# Patient Record
Sex: MALE | Race: WHITE | Employment: OTHER | URBAN - NONMETROPOLITAN AREA
[De-identification: names, ages, dates, MRNs, and addresses within clinical notes are randomized per-mention and may not be internally consistent; named-entity substitution may affect disease eponyms.]

---

## 2024-06-04 PROBLEM — K85.90 PANCREATITIS, UNSPECIFIED PANCREATITIS TYPE: Status: ACTIVE | Noted: 2024-06-04

## 2024-06-05 PROBLEM — I10 PRIMARY HYPERTENSION: Status: ACTIVE | Noted: 2024-06-05

## 2024-06-05 PROBLEM — E78.5 HYPERLIPIDEMIA: Status: ACTIVE | Noted: 2024-06-05

## 2024-06-05 PROBLEM — E11.65 POORLY CONTROLLED TYPE 2 DIABETES MELLITUS (HCC): Status: ACTIVE | Noted: 2024-06-05

## 2024-06-05 PROBLEM — K76.0 NAFLD (NONALCOHOLIC FATTY LIVER DISEASE): Status: ACTIVE | Noted: 2024-06-05

## 2024-06-05 PROBLEM — G47.00 INSOMNIA: Status: ACTIVE | Noted: 2024-06-05

## 2024-06-05 PROBLEM — F41.8 DEPRESSION WITH ANXIETY: Status: ACTIVE | Noted: 2024-06-05

## 2024-06-10 ENCOUNTER — TELEPHONE (OUTPATIENT)
Dept: PULMONOLOGY | Age: 64
End: 2024-06-10

## 2024-06-10 NOTE — TELEPHONE ENCOUNTER
----- Message from MARTIN Grover CNP sent at 6/8/2024 10:23 AM EDT -----  Please schedule patient for PFT and f/u in pulm with me in 2-3 months. Thanks!    Appointment made

## 2024-07-11 NOTE — PROGRESS NOTES
Dr. Umanzor, Anesthesia, reviewed case and low platelets of 65 on 6/8/24, 75 on 6/6/24. Dr. Umanzor would like patient to have a Hematology consult before cervical spine fusion surgery.  I notified Valeria MILIAN in Dr. Dee's office.

## 2024-07-11 NOTE — PROGRESS NOTES
Ani from [facility] called back from DR Dee office.  State had previous 2009 cervical and 2009 Lumbar .  She will call pt to have him call us for PAT call

## 2024-07-11 NOTE — PROGRESS NOTES
Called devika/britney and left message to have them call us back.  Also left message informing of patient Platelet level of 65

## 2024-07-15 RX ORDER — FUROSEMIDE 20 MG/1
40 TABLET ORAL DAILY
COMMUNITY

## 2024-07-15 RX ORDER — FLUTICASONE FUROATE, UMECLIDINIUM BROMIDE AND VILANTEROL TRIFENATATE 100; 62.5; 25 UG/1; UG/1; UG/1
1 POWDER RESPIRATORY (INHALATION) DAILY
COMMUNITY

## 2024-07-15 RX ORDER — SPIRONOLACTONE 25 MG/1
50 TABLET ORAL DAILY
COMMUNITY
Start: 2018-07-03

## 2024-07-15 RX ORDER — CHLORAL HYDRATE 500 MG
1 CAPSULE ORAL DAILY
COMMUNITY

## 2024-07-15 NOTE — PROGRESS NOTES
Left message for [facility]A [facility] Medical Records - needing Hematology clearance pre-op yet, per anesthesia request (low platelets at 65).  Await response.

## 2024-07-15 NOTE — PROGRESS NOTES
Hold for 7 days   Fish OIl,   Hold day of surgery   Lisinopril and lasix   Hold insulin day of surgery  Decrease Lantus to 30 units night before surgery     Take am of surgery  Trelegy

## 2024-07-15 NOTE — FLOWSHEET NOTE
Cervical Surgery Pre-op Instructions  Nothing to eat or drink after midnight the night before surgery except sips of water to take medications, this includes no mints, chewing gum or ice chips  No smoking or chewing tobacco 24 hours before surgery  Bring photo ID and any health insurance cards  Wear comfortable clean loose fitting clothing  Do not wear any jewelry, make up, body piercings or contact lenses   Bring your medication in the original bottles  Wear clean clothes to bed and place fresh clean sheets and pillowcases on your bed the night before surgery  Showering:  Shower 2 days before, day before and morning of surgery using the StartClean® kit provided (follow the instructions provided with the kit).  Do not shave the surgical area! If needed, your nurse will use clippers to remove any excess hair at the surgical site when you come in for surgery. Do not use a razor on the site of your surgery for at least 2 days prior to surgery because shaving can leave tiny nicks in the skin which may allow germs to enter and cause infection.  Please have 2 Home Health Agencies in mind for post op; 1st and 2nd choice.  Remember: POST OP ... NO BLTs ; No Bending, NO Lifting, NO Twisting until ok'd by .    If you have any questions about your preoperative preparations, please call the [facility] Pre-Admission Testing department at [phone].         START CLEAN® KIT SHOWER INSTRUCITONS SURGERY:      __7-15-24______ (date)  FIRST SHOWER: Two days before surgery: Take a shower and wash your entire body, including your hair and scalp in the following manner:  Wash your hair using normal shampoo. Make sure you rinse the shampoo from your hair and body. Wash your face with your regular soap or cleanser.  Use one of the sponges in the Start Clean® kit and apply 1/3 of the Chlorhexidine soap to the sponge, wash from your neck down.  This is very important. Do not use the soap on your face or hair and avoid private

## 2024-07-16 NOTE — PROGRESS NOTES
Medical clearance obtained.  Per PCP, patient's platelets low d/t chronic hx of cirrhosis likely.    Per surgeon's office, will repeat CBC day of surgery to check platelet count.

## 2024-07-17 ENCOUNTER — APPOINTMENT (OUTPATIENT)
Dept: GENERAL RADIOLOGY | Age: 64
End: 2024-07-17
Attending: ORTHOPAEDIC SURGERY
Payer: COMMERCIAL

## 2024-07-17 ENCOUNTER — ANESTHESIA (OUTPATIENT)
Dept: OPERATING ROOM | Age: 64
End: 2024-07-17
Payer: COMMERCIAL

## 2024-07-17 ENCOUNTER — ANESTHESIA EVENT (OUTPATIENT)
Dept: OPERATING ROOM | Age: 64
End: 2024-07-17
Payer: COMMERCIAL

## 2024-07-17 ENCOUNTER — HOSPITAL ENCOUNTER (OUTPATIENT)
Age: 64
Discharge: HOME | End: 2024-07-18
Attending: ORTHOPAEDIC SURGERY | Admitting: ORTHOPAEDIC SURGERY
Payer: COMMERCIAL

## 2024-07-17 DIAGNOSIS — Z98.1 S/P CERVICAL SPINAL FUSION: Primary | ICD-10-CM

## 2024-07-17 PROBLEM — Z98.890 POST-OPERATIVE STATE: Status: ACTIVE | Noted: 2024-07-17

## 2024-07-17 LAB
ABO: NORMAL
ANTIBODY SCREEN: NORMAL
DEPRECATED RDW RBC AUTO: 43.3 FL (ref 35–45)
ERYTHROCYTE [DISTWIDTH] IN BLOOD BY AUTOMATED COUNT: 14.4 % (ref 11.5–14.5)
GLUCOSE BLD STRIP.AUTO-MCNC: 156 MG/DL (ref 70–108)
GLUCOSE BLD STRIP.AUTO-MCNC: 293 MG/DL (ref 70–108)
HCT VFR BLD AUTO: 49.5 % (ref 42–52)
HGB BLD-MCNC: 16.6 GM/DL (ref 14–18)
MCH RBC QN AUTO: 28 PG (ref 26–33)
MCHC RBC AUTO-ENTMCNC: 33.5 GM/DL (ref 32.2–35.5)
MCV RBC AUTO: 83.5 FL (ref 80–94)
PLATELET # BLD AUTO: 80 THOU/MM3 (ref 130–400)
PMV BLD AUTO: 11.1 FL (ref 9.4–12.4)
RBC # BLD AUTO: 5.93 MILL/MM3 (ref 4.7–6.1)
RH FACTOR: NORMAL
SCAN OF BLOOD SMEAR: NORMAL
WBC # BLD AUTO: 3.9 THOU/MM3 (ref 4.8–10.8)

## 2024-07-17 PROCEDURE — 72020 X-RAY EXAM OF SPINE 1 VIEW: CPT

## 2024-07-17 PROCEDURE — 6360000002 HC RX W HCPCS: Performed by: NURSE ANESTHETIST, CERTIFIED REGISTERED

## 2024-07-17 PROCEDURE — 2720000010 HC SURG SUPPLY STERILE: Performed by: ORTHOPAEDIC SURGERY

## 2024-07-17 PROCEDURE — 6370000000 HC RX 637 (ALT 250 FOR IP): Performed by: PHYSICIAN ASSISTANT

## 2024-07-17 PROCEDURE — 2709999900 HC NON-CHARGEABLE SUPPLY: Performed by: ORTHOPAEDIC SURGERY

## 2024-07-17 PROCEDURE — 3600000014 HC SURGERY LEVEL 4 ADDTL 15MIN: Performed by: ORTHOPAEDIC SURGERY

## 2024-07-17 PROCEDURE — 2500000003 HC RX 250 WO HCPCS: Performed by: ORTHOPAEDIC SURGERY

## 2024-07-17 PROCEDURE — 82948 REAGENT STRIP/BLOOD GLUCOSE: CPT

## 2024-07-17 PROCEDURE — 2580000003 HC RX 258: Performed by: PHYSICIAN ASSISTANT

## 2024-07-17 PROCEDURE — 86850 RBC ANTIBODY SCREEN: CPT

## 2024-07-17 PROCEDURE — L0120 CERV FLEX N/ADJ FOAM PRE OTS: HCPCS | Performed by: ORTHOPAEDIC SURGERY

## 2024-07-17 PROCEDURE — 6360000002 HC RX W HCPCS: Performed by: PHYSICIAN ASSISTANT

## 2024-07-17 PROCEDURE — 7100000000 HC PACU RECOVERY - FIRST 15 MIN: Performed by: ORTHOPAEDIC SURGERY

## 2024-07-17 PROCEDURE — 7100000011 HC PHASE II RECOVERY - ADDTL 15 MIN: Performed by: ORTHOPAEDIC SURGERY

## 2024-07-17 PROCEDURE — P9037 PLATE PHERES LEUKOREDU IRRAD: HCPCS

## 2024-07-17 PROCEDURE — 3700000000 HC ANESTHESIA ATTENDED CARE: Performed by: ORTHOPAEDIC SURGERY

## 2024-07-17 PROCEDURE — 3700000001 HC ADD 15 MINUTES (ANESTHESIA): Performed by: ORTHOPAEDIC SURGERY

## 2024-07-17 PROCEDURE — 36415 COLL VENOUS BLD VENIPUNCTURE: CPT

## 2024-07-17 PROCEDURE — C1713 ANCHOR/SCREW BN/BN,TIS/BN: HCPCS | Performed by: ORTHOPAEDIC SURGERY

## 2024-07-17 PROCEDURE — 2500000003 HC RX 250 WO HCPCS: Performed by: NURSE ANESTHETIST, CERTIFIED REGISTERED

## 2024-07-17 PROCEDURE — 6360000002 HC RX W HCPCS: Performed by: ANESTHESIOLOGY

## 2024-07-17 PROCEDURE — 7100000010 HC PHASE II RECOVERY - FIRST 15 MIN: Performed by: ORTHOPAEDIC SURGERY

## 2024-07-17 PROCEDURE — 86901 BLOOD TYPING SEROLOGIC RH(D): CPT

## 2024-07-17 PROCEDURE — 86900 BLOOD TYPING SEROLOGIC ABO: CPT

## 2024-07-17 PROCEDURE — 2580000003 HC RX 258: Performed by: NURSE ANESTHETIST, CERTIFIED REGISTERED

## 2024-07-17 PROCEDURE — 3600000004 HC SURGERY LEVEL 4 BASE: Performed by: ORTHOPAEDIC SURGERY

## 2024-07-17 PROCEDURE — 85027 COMPLETE CBC AUTOMATED: CPT

## 2024-07-17 PROCEDURE — 7100000001 HC PACU RECOVERY - ADDTL 15 MIN: Performed by: ORTHOPAEDIC SURGERY

## 2024-07-17 DEVICE — GRAFT BNE 2 CC DBM FIBREX: Type: IMPLANTABLE DEVICE | Site: NECK | Status: FUNCTIONAL

## 2024-07-17 DEVICE — 4.0MM VARIABLE ANGLE SCREW, SELF-TAPPING, 16MM
Type: IMPLANTABLE DEVICE | Site: NECK | Status: FUNCTIONAL
Brand: ADMIRAL

## 2024-07-17 DEVICE — GRAFT BNE MED: Type: IMPLANTABLE DEVICE | Site: NECK | Status: FUNCTIONAL

## 2024-07-17 DEVICE — ALLOGRAFT BNE SPACER SM 0 14X12X7 MM PARL CORTICAL ELEMAX: Type: IMPLANTABLE DEVICE | Site: NECK | Status: FUNCTIONAL

## 2024-07-17 DEVICE — CERVICAL PLATE, 1 LEVEL, 12MM
Type: IMPLANTABLE DEVICE | Site: NECK | Status: FUNCTIONAL
Brand: ADMIRAL

## 2024-07-17 RX ORDER — SODIUM CHLORIDE 0.9 % (FLUSH) 0.9 %
5-40 SYRINGE (ML) INJECTION EVERY 12 HOURS SCHEDULED
Status: DISCONTINUED | OUTPATIENT
Start: 2024-07-17 | End: 2024-07-18 | Stop reason: HOSPADM

## 2024-07-17 RX ORDER — SODIUM CHLORIDE 9 MG/ML
INJECTION, SOLUTION INTRAMUSCULAR; INTRAVENOUS; SUBCUTANEOUS PRN
Status: DISCONTINUED | OUTPATIENT
Start: 2024-07-17 | End: 2024-07-17 | Stop reason: SDUPTHER

## 2024-07-17 RX ORDER — TRAZODONE HYDROCHLORIDE 100 MG/1
100 TABLET ORAL NIGHTLY
Status: DISCONTINUED | OUTPATIENT
Start: 2024-07-17 | End: 2024-07-18 | Stop reason: HOSPADM

## 2024-07-17 RX ORDER — GLUCAGON 1 MG/ML
1 KIT INJECTION PRN
Status: DISCONTINUED | OUTPATIENT
Start: 2024-07-17 | End: 2024-07-18 | Stop reason: HOSPADM

## 2024-07-17 RX ORDER — BUDESONIDE AND FORMOTEROL FUMARATE DIHYDRATE 80; 4.5 UG/1; UG/1
2 AEROSOL RESPIRATORY (INHALATION)
Status: DISCONTINUED | OUTPATIENT
Start: 2024-07-17 | End: 2024-07-18 | Stop reason: HOSPADM

## 2024-07-17 RX ORDER — ROCURONIUM BROMIDE 10 MG/ML
INJECTION, SOLUTION INTRAVENOUS PRN
Status: DISCONTINUED | OUTPATIENT
Start: 2024-07-17 | End: 2024-07-17 | Stop reason: SDUPTHER

## 2024-07-17 RX ORDER — INSULIN GLARGINE 100 [IU]/ML
60 INJECTION, SOLUTION SUBCUTANEOUS NIGHTLY
Status: DISCONTINUED | OUTPATIENT
Start: 2024-07-17 | End: 2024-07-18 | Stop reason: HOSPADM

## 2024-07-17 RX ORDER — OXYCODONE HYDROCHLORIDE 5 MG/1
10 TABLET ORAL EVERY 4 HOURS PRN
Status: DISCONTINUED | OUTPATIENT
Start: 2024-07-17 | End: 2024-07-18 | Stop reason: HOSPADM

## 2024-07-17 RX ORDER — SUCCINYLCHOLINE/SOD CL,ISO/PF 200MG/10ML
SYRINGE (ML) INTRAVENOUS PRN
Status: DISCONTINUED | OUTPATIENT
Start: 2024-07-17 | End: 2024-07-17 | Stop reason: SDUPTHER

## 2024-07-17 RX ORDER — SODIUM CHLORIDE 0.9 % (FLUSH) 0.9 %
5-40 SYRINGE (ML) INJECTION EVERY 12 HOURS SCHEDULED
Status: DISCONTINUED | OUTPATIENT
Start: 2024-07-17 | End: 2024-07-17 | Stop reason: HOSPADM

## 2024-07-17 RX ORDER — SODIUM CHLORIDE 0.9 % (FLUSH) 0.9 %
5-40 SYRINGE (ML) INJECTION PRN
Status: DISCONTINUED | OUTPATIENT
Start: 2024-07-17 | End: 2024-07-17 | Stop reason: HOSPADM

## 2024-07-17 RX ORDER — DULOXETIN HYDROCHLORIDE 30 MG/1
90 CAPSULE, DELAYED RELEASE ORAL DAILY
Status: DISCONTINUED | OUTPATIENT
Start: 2024-07-18 | End: 2024-07-18 | Stop reason: HOSPADM

## 2024-07-17 RX ORDER — BISACODYL 5 MG/1
5 TABLET, DELAYED RELEASE ORAL DAILY PRN
Status: DISCONTINUED | OUTPATIENT
Start: 2024-07-17 | End: 2024-07-18 | Stop reason: HOSPADM

## 2024-07-17 RX ORDER — INSULIN LISPRO 100 [IU]/ML
0-8 INJECTION, SOLUTION INTRAVENOUS; SUBCUTANEOUS
Status: DISCONTINUED | OUTPATIENT
Start: 2024-07-18 | End: 2024-07-18 | Stop reason: HOSPADM

## 2024-07-17 RX ORDER — DEXTROSE MONOHYDRATE 100 MG/ML
INJECTION, SOLUTION INTRAVENOUS CONTINUOUS PRN
Status: DISCONTINUED | OUTPATIENT
Start: 2024-07-17 | End: 2024-07-18 | Stop reason: HOSPADM

## 2024-07-17 RX ORDER — CYCLOBENZAPRINE HCL 10 MG
10 TABLET ORAL 3 TIMES DAILY PRN
Status: DISCONTINUED | OUTPATIENT
Start: 2024-07-17 | End: 2024-07-18 | Stop reason: HOSPADM

## 2024-07-17 RX ORDER — PREGABALIN 50 MG/1
100 CAPSULE ORAL 2 TIMES DAILY
Status: DISCONTINUED | OUTPATIENT
Start: 2024-07-17 | End: 2024-07-18 | Stop reason: HOSPADM

## 2024-07-17 RX ORDER — GABAPENTIN 300 MG/1
300 CAPSULE ORAL 3 TIMES DAILY
Status: DISCONTINUED | OUTPATIENT
Start: 2024-07-17 | End: 2024-07-18 | Stop reason: HOSPADM

## 2024-07-17 RX ORDER — HYDROMORPHONE HYDROCHLORIDE 2 MG/ML
INJECTION, SOLUTION INTRAMUSCULAR; INTRAVENOUS; SUBCUTANEOUS PRN
Status: DISCONTINUED | OUTPATIENT
Start: 2024-07-17 | End: 2024-07-17 | Stop reason: SDUPTHER

## 2024-07-17 RX ORDER — SODIUM CHLORIDE 9 MG/ML
INJECTION, SOLUTION INTRAVENOUS PRN
Status: DISCONTINUED | OUTPATIENT
Start: 2024-07-17 | End: 2024-07-18 | Stop reason: HOSPADM

## 2024-07-17 RX ORDER — LIDOCAINE HYDROCHLORIDE AND EPINEPHRINE 10; 10 MG/ML; UG/ML
INJECTION, SOLUTION INFILTRATION; PERINEURAL PRN
Status: DISCONTINUED | OUTPATIENT
Start: 2024-07-17 | End: 2024-07-17 | Stop reason: ALTCHOICE

## 2024-07-17 RX ORDER — POLYETHYLENE GLYCOL 3350 17 G/17G
17 POWDER, FOR SOLUTION ORAL DAILY
Status: DISCONTINUED | OUTPATIENT
Start: 2024-07-17 | End: 2024-07-18 | Stop reason: HOSPADM

## 2024-07-17 RX ORDER — PANTOPRAZOLE SODIUM 40 MG/1
40 TABLET, DELAYED RELEASE ORAL
Status: DISCONTINUED | OUTPATIENT
Start: 2024-07-18 | End: 2024-07-18 | Stop reason: HOSPADM

## 2024-07-17 RX ORDER — LIDOCAINE HCL/PF 100 MG/5ML
SYRINGE (ML) INJECTION PRN
Status: DISCONTINUED | OUTPATIENT
Start: 2024-07-17 | End: 2024-07-17 | Stop reason: SDUPTHER

## 2024-07-17 RX ORDER — PROPOFOL 10 MG/ML
INJECTION, EMULSION INTRAVENOUS PRN
Status: DISCONTINUED | OUTPATIENT
Start: 2024-07-17 | End: 2024-07-17 | Stop reason: SDUPTHER

## 2024-07-17 RX ORDER — MIDAZOLAM HYDROCHLORIDE 1 MG/ML
INJECTION INTRAMUSCULAR; INTRAVENOUS PRN
Status: DISCONTINUED | OUTPATIENT
Start: 2024-07-17 | End: 2024-07-17 | Stop reason: SDUPTHER

## 2024-07-17 RX ORDER — ONDANSETRON 2 MG/ML
4 INJECTION INTRAMUSCULAR; INTRAVENOUS EVERY 6 HOURS PRN
Status: DISCONTINUED | OUTPATIENT
Start: 2024-07-17 | End: 2024-07-18 | Stop reason: HOSPADM

## 2024-07-17 RX ORDER — DEXAMETHASONE SODIUM PHOSPHATE 10 MG/ML
INJECTION, EMULSION INTRAMUSCULAR; INTRAVENOUS PRN
Status: DISCONTINUED | OUTPATIENT
Start: 2024-07-17 | End: 2024-07-17 | Stop reason: SDUPTHER

## 2024-07-17 RX ORDER — ACETAMINOPHEN 325 MG/1
650 TABLET ORAL EVERY 6 HOURS
Status: DISCONTINUED | OUTPATIENT
Start: 2024-07-17 | End: 2024-07-18 | Stop reason: HOSPADM

## 2024-07-17 RX ORDER — SPIRONOLACTONE 25 MG/1
50 TABLET ORAL DAILY
Status: DISCONTINUED | OUTPATIENT
Start: 2024-07-17 | End: 2024-07-18 | Stop reason: HOSPADM

## 2024-07-17 RX ORDER — SODIUM CHLORIDE 9 MG/ML
INJECTION, SOLUTION INTRAVENOUS CONTINUOUS PRN
Status: DISCONTINUED | OUTPATIENT
Start: 2024-07-17 | End: 2024-07-17 | Stop reason: SDUPTHER

## 2024-07-17 RX ORDER — ONDANSETRON 4 MG/1
4 TABLET, ORALLY DISINTEGRATING ORAL EVERY 8 HOURS PRN
Status: DISCONTINUED | OUTPATIENT
Start: 2024-07-17 | End: 2024-07-18 | Stop reason: HOSPADM

## 2024-07-17 RX ORDER — OXYCODONE HYDROCHLORIDE 5 MG/1
TABLET ORAL
Status: DISPENSED
Start: 2024-07-17 | End: 2024-07-18

## 2024-07-17 RX ORDER — INSULIN LISPRO 100 [IU]/ML
0-4 INJECTION, SOLUTION INTRAVENOUS; SUBCUTANEOUS NIGHTLY
Status: DISCONTINUED | OUTPATIENT
Start: 2024-07-17 | End: 2024-07-18 | Stop reason: HOSPADM

## 2024-07-17 RX ORDER — SODIUM CHLORIDE 0.9 % (FLUSH) 0.9 %
5-40 SYRINGE (ML) INJECTION PRN
Status: DISCONTINUED | OUTPATIENT
Start: 2024-07-17 | End: 2024-07-18 | Stop reason: HOSPADM

## 2024-07-17 RX ORDER — SODIUM CHLORIDE 9 MG/ML
INJECTION, SOLUTION INTRAVENOUS CONTINUOUS
Status: DISCONTINUED | OUTPATIENT
Start: 2024-07-17 | End: 2024-07-17 | Stop reason: HOSPADM

## 2024-07-17 RX ORDER — SODIUM CHLORIDE 9 MG/ML
INJECTION, SOLUTION INTRAVENOUS PRN
Status: DISCONTINUED | OUTPATIENT
Start: 2024-07-17 | End: 2024-07-17 | Stop reason: HOSPADM

## 2024-07-17 RX ORDER — FUROSEMIDE 40 MG/1
40 TABLET ORAL DAILY
Status: DISCONTINUED | OUTPATIENT
Start: 2024-07-17 | End: 2024-07-18 | Stop reason: HOSPADM

## 2024-07-17 RX ORDER — FENTANYL CITRATE 50 UG/ML
INJECTION, SOLUTION INTRAMUSCULAR; INTRAVENOUS PRN
Status: DISCONTINUED | OUTPATIENT
Start: 2024-07-17 | End: 2024-07-17 | Stop reason: SDUPTHER

## 2024-07-17 RX ORDER — OXYCODONE HYDROCHLORIDE 5 MG/1
5 TABLET ORAL EVERY 4 HOURS PRN
Status: DISCONTINUED | OUTPATIENT
Start: 2024-07-17 | End: 2024-07-18 | Stop reason: HOSPADM

## 2024-07-17 RX ORDER — ONDANSETRON 2 MG/ML
INJECTION INTRAMUSCULAR; INTRAVENOUS PRN
Status: DISCONTINUED | OUTPATIENT
Start: 2024-07-17 | End: 2024-07-17 | Stop reason: SDUPTHER

## 2024-07-17 RX ADMIN — HYDROMORPHONE HYDROCHLORIDE 0.5 MG: 1 INJECTION, SOLUTION INTRAMUSCULAR; INTRAVENOUS; SUBCUTANEOUS at 13:25

## 2024-07-17 RX ADMIN — PROPOFOL 150 MG: 10 INJECTION, EMULSION INTRAVENOUS at 11:12

## 2024-07-17 RX ADMIN — INSULIN GLARGINE 60 UNITS: 100 INJECTION, SOLUTION SUBCUTANEOUS at 22:46

## 2024-07-17 RX ADMIN — WATER 2000 MG: 1 INJECTION INTRAMUSCULAR; INTRAVENOUS; SUBCUTANEOUS at 11:25

## 2024-07-17 RX ADMIN — FUROSEMIDE 40 MG: 40 TABLET ORAL at 22:47

## 2024-07-17 RX ADMIN — ROCURONIUM BROMIDE 40 MG: 10 INJECTION INTRAVENOUS at 11:20

## 2024-07-17 RX ADMIN — HYDROMORPHONE HYDROCHLORIDE 1 MG: 2 INJECTION INTRAMUSCULAR; INTRAVENOUS; SUBCUTANEOUS at 12:57

## 2024-07-17 RX ADMIN — SODIUM CHLORIDE 20 ML: 9 INJECTION INTRAMUSCULAR; INTRAVENOUS; SUBCUTANEOUS at 11:26

## 2024-07-17 RX ADMIN — SODIUM CHLORIDE: 9 INJECTION, SOLUTION INTRAVENOUS at 11:55

## 2024-07-17 RX ADMIN — SPIRONOLACTONE 50 MG: 25 TABLET ORAL at 22:46

## 2024-07-17 RX ADMIN — TRAZODONE HYDROCHLORIDE 100 MG: 100 TABLET ORAL at 22:46

## 2024-07-17 RX ADMIN — HYDROMORPHONE HYDROCHLORIDE 1 MG: 2 INJECTION INTRAMUSCULAR; INTRAVENOUS; SUBCUTANEOUS at 11:47

## 2024-07-17 RX ADMIN — SODIUM CHLORIDE: 9 INJECTION, SOLUTION INTRAVENOUS at 11:23

## 2024-07-17 RX ADMIN — ACETAMINOPHEN 650 MG: 325 TABLET ORAL at 22:46

## 2024-07-17 RX ADMIN — ONDANSETRON 4 MG: 2 INJECTION INTRAMUSCULAR; INTRAVENOUS at 12:32

## 2024-07-17 RX ADMIN — WATER 2000 MG: 1 INJECTION INTRAMUSCULAR; INTRAVENOUS; SUBCUTANEOUS at 22:46

## 2024-07-17 RX ADMIN — FENTANYL CITRATE 100 MCG: 50 INJECTION, SOLUTION INTRAMUSCULAR; INTRAVENOUS at 11:12

## 2024-07-17 RX ADMIN — DEXAMETHASONE SODIUM PHOSPHATE 10 MG: 10 INJECTION, EMULSION INTRAMUSCULAR; INTRAVENOUS at 11:13

## 2024-07-17 RX ADMIN — GABAPENTIN 300 MG: 300 CAPSULE ORAL at 22:48

## 2024-07-17 RX ADMIN — CYCLOBENZAPRINE 10 MG: 10 TABLET, FILM COATED ORAL at 17:04

## 2024-07-17 RX ADMIN — Medication 140 MG: at 11:12

## 2024-07-17 RX ADMIN — SUGAMMADEX 200 MG: 100 INJECTION, SOLUTION INTRAVENOUS at 12:44

## 2024-07-17 RX ADMIN — ROCURONIUM BROMIDE 10 MG: 10 INJECTION INTRAVENOUS at 11:12

## 2024-07-17 RX ADMIN — OXYCODONE 10 MG: 5 TABLET ORAL at 14:48

## 2024-07-17 RX ADMIN — PREGABALIN 100 MG: 50 CAPSULE ORAL at 22:47

## 2024-07-17 RX ADMIN — SODIUM CHLORIDE, PRESERVATIVE FREE 10 ML: 5 INJECTION INTRAVENOUS at 22:49

## 2024-07-17 RX ADMIN — OXYCODONE HYDROCHLORIDE 5 MG: 5 TABLET ORAL at 22:45

## 2024-07-17 RX ADMIN — Medication 100 MG: at 11:12

## 2024-07-17 RX ADMIN — MIDAZOLAM 2 MG: 1 INJECTION INTRAMUSCULAR; INTRAVENOUS at 11:11

## 2024-07-17 RX ADMIN — HYDROMORPHONE HYDROCHLORIDE 0.5 MG: 1 INJECTION, SOLUTION INTRAMUSCULAR; INTRAVENOUS; SUBCUTANEOUS at 13:20

## 2024-07-17 RX ADMIN — PROPOFOL 50 MG: 10 INJECTION, EMULSION INTRAVENOUS at 11:14

## 2024-07-17 RX ADMIN — ROCURONIUM BROMIDE 10 MG: 10 INJECTION INTRAVENOUS at 12:26

## 2024-07-17 RX ADMIN — SODIUM CHLORIDE: 9 INJECTION, SOLUTION INTRAVENOUS at 08:24

## 2024-07-17 ASSESSMENT — PAIN - FUNCTIONAL ASSESSMENT
PAIN_FUNCTIONAL_ASSESSMENT: ACTIVITIES ARE NOT PREVENTED
PAIN_FUNCTIONAL_ASSESSMENT: 0-10
PAIN_FUNCTIONAL_ASSESSMENT: ACTIVITIES ARE NOT PREVENTED
PAIN_FUNCTIONAL_ASSESSMENT: ACTIVITIES ARE NOT PREVENTED

## 2024-07-17 ASSESSMENT — PAIN DESCRIPTION - DESCRIPTORS
DESCRIPTORS: SORE
DESCRIPTORS: ACHING;SORE
DESCRIPTORS: SHARP;ACHING
DESCRIPTORS: ACHING
DESCRIPTORS: PRESSURE;NUMBNESS
DESCRIPTORS: ACHING
DESCRIPTORS: TENDER

## 2024-07-17 ASSESSMENT — PAIN SCALES - GENERAL
PAINLEVEL_OUTOF10: 7
PAINLEVEL_OUTOF10: 6
PAINLEVEL_OUTOF10: 4
PAINLEVEL_OUTOF10: 6
PAINLEVEL_OUTOF10: 6
PAINLEVEL_OUTOF10: 7
PAINLEVEL_OUTOF10: 5

## 2024-07-17 ASSESSMENT — PAIN DESCRIPTION - ORIENTATION
ORIENTATION: ANTERIOR

## 2024-07-17 ASSESSMENT — PAIN DESCRIPTION - LOCATION
LOCATION: NECK

## 2024-07-17 ASSESSMENT — PAIN DESCRIPTION - PAIN TYPE
TYPE: SURGICAL PAIN

## 2024-07-17 ASSESSMENT — PAIN DESCRIPTION - ONSET: ONSET: ON-GOING

## 2024-07-17 ASSESSMENT — PAIN DESCRIPTION - FREQUENCY: FREQUENCY: CONTINUOUS

## 2024-07-17 NOTE — PROGRESS NOTES
Patient admitted to [facility] room 4. IV started. Consent signed and in room. All questions and concerns answered. Side rails up. Call light in reach. No other needs voiced at this time.

## 2024-07-17 NOTE — H&P
[address] [address]                            PREOPERATIVE H&P      PATIENT NAME: DONTAE MONTENEGOR JR             : 1960  MED REC NO: [id number]                       ROOM: [facility]                                               (General) POOL                                               RM    ACCOUNT NO: [id number]                       ADMIT DATE: 2024  PROVIDER: TRICE Ray      PLANNED SURGERY:  Removal of plate C5-6 with ACDF of C4-5 with Dr. Dee on the date of 2024 at [facility].    CHIEF COMPLAINT:  Cervical pain and bilateral upper extremity numbness and tingling.    HISTORY OF PRESENT ILLNESS:  The patient is a 63-year-old gentleman who returned to our office with complaints of significant cervical spine pain.  He has been through previous ACDF C5-C6, completed back in year  after being involved in a work-related injury with slipping and falling on ice.  He has developed adjacent level stenosis above the previous operation which has been approved through his claim through [employer] compensation.  His symptoms include symptoms of neck pain, headaches, and upper extremity tingling and numbness, which have continued to worsen and failed to improve despite conservative treatment.  With his failure to improve, he has elected to proceed with further operative management.  Conservative treatment has included the use of prescription anti-inflammatories, muscle relaxants, and home exercises.  Preoperative clearance was provided through his family provider, Dr. Gaby Moss.  This did indicate a low platelet level at 65,000.  The planned approach for this, which is likely due to his liver cirrhosis is infusion of platelets prior to surgery which has been ordered and set for his preoperative care in same-day surgery.  Otherwise, he has been seen and cleared by his family provider and is ready to  proceed with surgical intervention.    ALLERGIES:  INCLUDE MORPHINE.      PAST MEDICAL HISTORY:  Type 2 diabetes, liver disease, depression, anxiety.    CURRENT MEDICATIONS:  Include baclofen, Nexium, spironolactone, Lasix, Cymbalta, Lyrica, trazodone, tizanidine, nadolol, Basaglar, and Humalog.    PAST SURGICAL HISTORY:  Includes right wrist surgery, Achilles tendon surgery, ACDF C5-6 completed by Dr. Dee in 2009, and previous lumbar spine surgery.    SOCIAL HISTORY:  Smoking status, he is a former smoker.  He no longer drinks alcohol.  He lives at home in a private home.    REVIEW OF SYSTEMS:  GENERAL:  Denies fever, fatigue, chills.  RESPIRATORY:  Denies shortness of breath, productive cough, wheezing.  CARDIOVASCULAR:  Denies chest pain, palpitations, leg swelling.  GASTROINTESTINAL:  Denies nausea, vomiting, diarrhea, abdominal pain.  GENITOURINARY:  Denies dysuria, bladder incontinence.  NEUROLOGIC:  Denies seizure, blackout, fainting.  Positive for headaches.  MUSCULOSKELETAL:  Significant for neck pain, arm pain, shoulder pain.    PHYSICAL EXAMINATION:  VITAL SIGNS:  Most recent vital signs show height 6 feet 2 inches, weight 250, BMI is 32.09.  GENERAL:  The patient is pleasant, cooperative; awake, alert, and oriented x3.  He is seated in a chair, in no acute distress.  SPINE:  Examination of the cervical spine shows no open wounds or lesions.  No midline or paraspinal tenderness noted.  EXTREMITIES:  Bilateral upper extremity exam shows no open wounds or lesions.  Pulses are +2 in the radial artery bilateral, 5/5 strength is maintained with , finger extension, wrist extension, elbow flexion, elbow extension and shoulder abduction.  Sensation is intact in the cervical spine.    IMAGING:  Cervical MRI completed 05/24/2024 shows C4-5 level moderate bilateral foraminal stenosis due to the combination of facet disease and disk osteophyte complex.  Prior anterior fixation and decompression across the C5-6

## 2024-07-17 NOTE — PROGRESS NOTES
1253 Patient arrives to PACU, alert to voice. Resp easy and unlabored on 2L NC. Pt having some pain, medicated per CRNA. VSS    1305 Pt rests in bed, with eyes closed. States pain is a 4/10 and tolerable at this time. VSS    1315 Patient resting in bed with eyes closed, snoring on and off. Pt states pain is still tolerable at this time, VSS    1319 Patient states pain is worsening, now rates it a 7/10.    1320 0.5 mg of Dilaudid given at this time.     1325 Pain unchanged, 0.5 mg of Dilaudid given at this time.     1335 Pt resting in bed with eyes closed, snoring. Resp easy and unlabored on 2L NC. Pain 5/10 and tolerable at this time. VSS    1345  Patient meets criteria for discharge from PACU at this time. Transported to [facility] in stable condition.     1350 Report given to Cinda BEE

## 2024-07-17 NOTE — ANESTHESIA POSTPROCEDURE EVALUATION
Department of Anesthesiology  Postprocedure Note    Patient: Mitchell Mccall Jr  MRN: [id number]  YOB: 1960  Date of evaluation: 7/17/2024    Procedure Summary       Date: 07/17/24 Room / Location: [facility] OR  / [facility] OR    Anesthesia Start: 1110 Anesthesia Stop: 1258    Procedure: Removal of Plate C5-C6, ACDF C4-C6 with Seaspine (Neck) Diagnosis:       Radiculopathy, cervical region      (Radiculopathy, cervical region [M54.12])    Surgeons: Cliff Dee MD Responsible Provider: Simeon Yeung DO    Anesthesia Type: general ASA Status: 3            Anesthesia Type: No value filed.    Izzy Phase I: Izzy Score: 9    Izzy Phase II: Izzy Score: 9    Anesthesia Post Evaluation    Patient location during evaluation: PACU  Patient participation: complete - patient participated  Level of consciousness: awake  Airway patency: patent  Nausea & Vomiting: no nausea  Cardiovascular status: hemodynamically stable  Respiratory status: acceptable  Hydration status: stable  Pain management: adequate    No notable events documented.

## 2024-07-17 NOTE — ANESTHESIA PRE PROCEDURE
Department of Anesthesiology  Preprocedure Note       Name:  Mitchell Mccall Jr   Age:  63 y.o.  :  1960                                          MRN:  [id number]         Date:  2024      Surgeon: Surgeon(s):  Cliff Dee MD    Procedure: Procedure(s):  Removal of Plate C5-C6, ACDF C4-C6 with Seaspine    Medications prior to admission:   Prior to Admission medications    Medication Sig Start Date End Date Taking? Authorizing Provider   fluticasone-umeclidin-vilant (TRELEGY ELLIPTA) 100-62.5-25 MCG/ACT AEPB inhaler Inhale 1 puff into the lungs daily   Yes Abraham Ochoa MD   spironolactone (ALDACTONE) 25 MG tablet Take 2 tablets by mouth daily 7/3/18  Yes Abraham Ochoa MD   furosemide (LASIX) 20 MG tablet Take 2 tablets by mouth daily   Yes Abraham Ochoa MD   Omega-3 Fatty Acids (FISH OIL) 1000 MG capsule Take 1 capsule by mouth daily    Abraham Ochoa MD   tiotropium-olodaterol (STIOLTO) 2.5-2.5 MCG/ACT AERS Inhale 2 puffs into the lungs daily  Patient not taking: Reported on 7/15/2024 6/9/24   Madhu Stapleton, APRN - CNP   pantoprazole (PROTONIX) 40 MG tablet Take 1 tablet by mouth every morning (before breakfast) 24   Edwin Cm DO   insulin glargine (LANTUS;BASAGLAR) 100 UNIT/ML injection pen Inject 60 Units into the skin nightly 23  Abraham Ochoa MD   tiZANidine (ZANAFLEX) 4 MG tablet Take 1 tablet by mouth 3 times daily as needed 24   Abraham Ochoa MD   insulin lispro (HUMALOG) 100 UNIT/ML injection vial Inject into the skin 3 times daily (before meals)    Abraham Ochoa MD   traZODone (DESYREL) 100 MG tablet Take 1 tablet by mouth nightly    Abraham Ochoa MD   gabapentin (NEURONTIN) 300 MG capsule Take 1 capsule by mouth 3 times daily.  Patient not taking: Reported on 2024    Abraham Ochoa MD   Melatonin 10 MG TABS Take 20 mg by mouth daily    Abraham Ohcoa MD   bisacodyl (DULCOLAX)

## 2024-07-17 NOTE — PROGRESS NOTES
Patient resting in bed.States that his neck feels stiff. Gave Flexiril as ordered. Patient has snack and drink but does not want a meal yet. Side rails up. Call light in reach. No other needs or concerns voiced at this time.

## 2024-07-17 NOTE — BRIEF OP NOTE
Brief Postoperative Note      Patient: Mitchell Mccall Jr  YOB: 1960  MRN: [id number]    Date of Procedure: 7/17/2024    Pre-Op Diagnosis Codes:     * Radiculopathy, cervical region [M54.12]    Post-Op Diagnosis: Same       Procedures:  ROP and assess C5-C6, ACDF C4-C5    Surgeon(s):  Cliff Dee MD    Assistant:  Jose Jett PAC    Anesthesia: General    Estimated Blood Loss (mL): less than 50     Complications: None    Specimens:   * No specimens in log *    Implants:  Implant Name Type Inv. Item Serial No.  Lot No. LRB No. Used Action   GRAFT BNE MED - [id number]  GRAFT BNE MED [id number] BIOLOGICA  N/A 1 Implanted   ALLOGRAFT BNE SPACER SM 0 [id number] MM PARL CORTICAL ELEMAX - [id number]  ALLOGRAFT BNE SPACER SM 0 [id number] MM PARL CORTICAL ELEMAX [id number] [employer] [id number] N/A 1 Implanted   GRAFT BNE 2 CC DBM FIBREX - [id number]  GRAFT BNE 2 CC DBM FIBREX  [employer]  N/A 1 Implanted   SCREW SPNL ST 4X16 MM VA ADMIRAL [id number] - [id number]  SCREW SPNL ST 4X16 MM VA ADMIRAL [id number]  Piiku  N/A 4 Implanted   PLATE SPNL 1 LEVEL 12 MM CERV ADMIRAL [id number] - [id number]  PLATE SPNL 1 LEVEL 12 MM CERV ADMIRAL [id number]  Radiation Monitoring [employer]  N/A 1 Implanted         Drains:   Closed/Suction Drain Neck (Active)       Findings:  Infection Present At Time Of Surgery (PATOS) (choose all levels that have infection present):  No infection present  Other Findings: same    Electronically signed by Cliff Dee MD on 7/17/2024 at 12:52 PM

## 2024-07-17 NOTE — H&P
I have reviewed the history and physical and examined the patient.  I find no relevant changes.  I have reviewed with the patient and/or family members, during the preoperative office visit the risks, benefits, and alternatives to the procedure.    Cliff Dee MD

## 2024-07-18 VITALS
RESPIRATION RATE: 16 BRPM | HEIGHT: 74 IN | HEART RATE: 67 BPM | DIASTOLIC BLOOD PRESSURE: 69 MMHG | SYSTOLIC BLOOD PRESSURE: 133 MMHG | OXYGEN SATURATION: 97 % | WEIGHT: 258 LBS | BODY MASS INDEX: 33.11 KG/M2 | TEMPERATURE: 97.9 F

## 2024-07-18 LAB
BASOPHILS ABSOLUTE: 0 THOU/MM3 (ref 0–0.1)
BASOPHILS NFR BLD AUTO: 0.2 %
DEPRECATED RDW RBC AUTO: 43.6 FL (ref 35–45)
EOSINOPHIL NFR BLD AUTO: 0 %
EOSINOPHILS ABSOLUTE: 0 THOU/MM3 (ref 0–0.4)
ERYTHROCYTE [DISTWIDTH] IN BLOOD BY AUTOMATED COUNT: 14.1 % (ref 11.5–14.5)
GLUCOSE BLD STRIP.AUTO-MCNC: 240 MG/DL (ref 70–108)
GLUCOSE BLD STRIP.AUTO-MCNC: 259 MG/DL (ref 70–108)
HCT VFR BLD AUTO: 47.2 % (ref 42–52)
HGB BLD-MCNC: 15.6 GM/DL (ref 14–18)
IMM GRANULOCYTES # BLD AUTO: 0.03 THOU/MM3 (ref 0–0.07)
IMM GRANULOCYTES NFR BLD AUTO: 0.5 %
LYMPHOCYTES ABSOLUTE: 0.6 THOU/MM3 (ref 1–4.8)
LYMPHOCYTES NFR BLD AUTO: 8.8 %
MCH RBC QN AUTO: 28 PG (ref 26–33)
MCHC RBC AUTO-ENTMCNC: 33.1 GM/DL (ref 32.2–35.5)
MCV RBC AUTO: 84.7 FL (ref 80–94)
MONOCYTES ABSOLUTE: 0.6 THOU/MM3 (ref 0.4–1.3)
MONOCYTES NFR BLD AUTO: 9.1 %
NEUTROPHILS ABSOLUTE: 5.4 THOU/MM3 (ref 1.8–7.7)
NEUTROPHILS NFR BLD AUTO: 81.4 %
NRBC BLD AUTO-RTO: 0 /100 WBC
PLATELET # BLD AUTO: 81 THOU/MM3 (ref 130–400)
PMV BLD AUTO: 11 FL (ref 9.4–12.4)
RBC # BLD AUTO: 5.57 MILL/MM3 (ref 4.7–6.1)
WBC # BLD AUTO: 6.6 THOU/MM3 (ref 4.8–10.8)

## 2024-07-18 PROCEDURE — 6370000000 HC RX 637 (ALT 250 FOR IP): Performed by: PHYSICIAN ASSISTANT

## 2024-07-18 PROCEDURE — 94640 AIRWAY INHALATION TREATMENT: CPT

## 2024-07-18 PROCEDURE — 36415 COLL VENOUS BLD VENIPUNCTURE: CPT

## 2024-07-18 PROCEDURE — 82948 REAGENT STRIP/BLOOD GLUCOSE: CPT

## 2024-07-18 PROCEDURE — 94760 N-INVAS EAR/PLS OXIMETRY 1: CPT

## 2024-07-18 PROCEDURE — 6360000002 HC RX W HCPCS: Performed by: PHYSICIAN ASSISTANT

## 2024-07-18 PROCEDURE — 85025 COMPLETE CBC W/AUTO DIFF WBC: CPT

## 2024-07-18 PROCEDURE — 2580000003 HC RX 258: Performed by: PHYSICIAN ASSISTANT

## 2024-07-18 RX ORDER — CYCLOBENZAPRINE HCL 10 MG
10 TABLET ORAL 3 TIMES DAILY PRN
Qty: 50 TABLET | Refills: 0 | Status: SHIPPED | OUTPATIENT
Start: 2024-07-18 | End: 2024-07-28

## 2024-07-18 RX ORDER — OXYCODONE HYDROCHLORIDE 5 MG/1
5 TABLET ORAL EVERY 4 HOURS PRN
Qty: 42 TABLET | Refills: 0 | Status: SHIPPED | OUTPATIENT
Start: 2024-07-18 | End: 2024-07-25

## 2024-07-18 RX ORDER — DIPHENHYDRAMINE HCL 25 MG
25 TABLET ORAL ONCE
Status: COMPLETED | OUTPATIENT
Start: 2024-07-18 | End: 2024-07-18

## 2024-07-18 RX ADMIN — INSULIN LISPRO 2 UNITS: 100 INJECTION, SOLUTION INTRAVENOUS; SUBCUTANEOUS at 09:23

## 2024-07-18 RX ADMIN — SPIRONOLACTONE 50 MG: 25 TABLET ORAL at 09:18

## 2024-07-18 RX ADMIN — PANTOPRAZOLE SODIUM 40 MG: 40 TABLET, DELAYED RELEASE ORAL at 05:37

## 2024-07-18 RX ADMIN — GABAPENTIN 300 MG: 300 CAPSULE ORAL at 09:18

## 2024-07-18 RX ADMIN — OXYCODONE 10 MG: 5 TABLET ORAL at 09:22

## 2024-07-18 RX ADMIN — DIPHENHYDRAMINE HYDROCHLORIDE 25 MG: 25 TABLET ORAL at 12:43

## 2024-07-18 RX ADMIN — BUDESONIDE AND FORMOTEROL FUMARATE DIHYDRATE 2 PUFF: 80; 4.5 AEROSOL RESPIRATORY (INHALATION) at 05:08

## 2024-07-18 RX ADMIN — DULOXETINE HYDROCHLORIDE 90 MG: 30 CAPSULE, DELAYED RELEASE ORAL at 09:17

## 2024-07-18 RX ADMIN — PREGABALIN 100 MG: 50 CAPSULE ORAL at 09:21

## 2024-07-18 RX ADMIN — TIOTROPIUM BROMIDE INHALATION SPRAY 2 PUFF: 3.12 SPRAY, METERED RESPIRATORY (INHALATION) at 05:08

## 2024-07-18 RX ADMIN — SODIUM CHLORIDE, PRESERVATIVE FREE 10 ML: 5 INJECTION INTRAVENOUS at 09:18

## 2024-07-18 RX ADMIN — FUROSEMIDE 40 MG: 40 TABLET ORAL at 09:18

## 2024-07-18 RX ADMIN — INSULIN LISPRO 4 UNITS: 100 INJECTION, SOLUTION INTRAVENOUS; SUBCUTANEOUS at 12:09

## 2024-07-18 RX ADMIN — WATER 2000 MG: 1 INJECTION INTRAMUSCULAR; INTRAVENOUS; SUBCUTANEOUS at 05:40

## 2024-07-18 RX ADMIN — OXYCODONE 10 MG: 5 TABLET ORAL at 05:35

## 2024-07-18 ASSESSMENT — PAIN SCALES - GENERAL
PAINLEVEL_OUTOF10: 4
PAINLEVEL_OUTOF10: 7
PAINLEVEL_OUTOF10: 8
PAINLEVEL_OUTOF10: 5

## 2024-07-18 ASSESSMENT — PAIN - FUNCTIONAL ASSESSMENT
PAIN_FUNCTIONAL_ASSESSMENT: ACTIVITIES ARE NOT PREVENTED

## 2024-07-18 ASSESSMENT — PAIN DESCRIPTION - ONSET
ONSET: ON-GOING
ONSET: ON-GOING

## 2024-07-18 ASSESSMENT — PAIN DESCRIPTION - LOCATION
LOCATION: NECK

## 2024-07-18 ASSESSMENT — PAIN DESCRIPTION - DESCRIPTORS
DESCRIPTORS: ACHING

## 2024-07-18 ASSESSMENT — PAIN DESCRIPTION - ORIENTATION
ORIENTATION: ANTERIOR

## 2024-07-18 ASSESSMENT — PAIN DESCRIPTION - PAIN TYPE
TYPE: ACUTE PAIN;SURGICAL PAIN
TYPE: ACUTE PAIN;SURGICAL PAIN

## 2024-07-18 ASSESSMENT — PAIN DESCRIPTION - FREQUENCY
FREQUENCY: CONTINUOUS
FREQUENCY: CONTINUOUS

## 2024-07-18 NOTE — CARE COORDINATION
Case Management Assessment Initial Evaluation    Date/Time of Evaluation: 7/18/2024 8:20 AM  Assessment Completed by: Chyna Whitley RN    If patient is discharged prior to next notation, then this note serves as note for discharge by case management.    Patient Name: Mitchell Mccall Jr                   YOB: 1960  Diagnosis: Radiculopathy, cervical region [M54.12]  Post-operative state [Z98.890]                   Date / Time: 7/17/2024  6:00 AM  Location: [address] [place] [address]     Patient Admission Status: Outpatient in a bed   Readmission Risk Low 0-14, Mod 15-19), High > 20: Readmission Risk Score: 7.7    Current PCP: Gaby Moss MD    Additional Case Management Notes: planned surgery with Dr Dee    Procedures: 7/17  1. Anterior cervical diskectomy at level C4-C5 with complete uncus decompression and clearance of canal stenosis at level of C4-C5.  2. Anterior cervical interbody fusion at level of C4-C5.  3. Use of allograft bone graft tricortical in structure, Xtant Elevate, a 7 x 14 x 13 mm size for the level of the C4-C5 fusion.  4. Use of allograft bone graft with cervical spine plating.  Imaging: na  POD 1, monitor YANIRA drain output. DM management, hgb 15.6.    Patient Goals/Plan/Treatment Preferences: Met with Mitchell and his daughter initially. He lives home with wife [place] in [facility]. He has needed DME as listed and insurance confirmed Ted with [employer].     Pt wife will be in later this am with contact information for Ted. Pt preference for HH/drain mgmt is [facility]. Called referral to Annita Baires at [facility] [phone] ext. [phone] [employer] office is calling and arranging to get approval for C9 form to be completed for HH/YANIRA drain management.     11:53 AM  Waiting on update re: HH/approval.        07/18/24 0900   Service Assessment   Patient Orientation Alert and Oriented   Cognition Alert   History Provided By Patient   Primary Caregiver Self   Accompanied By/Relationship

## 2024-07-18 NOTE — PROGRESS NOTES
Pt admitted to  7K13 from Same Day Surgery.     Complaints: Cervical Pain     IV normal saline infusing into the hand left, condition patent and no redness running by gravity  with about 800 mls in the bag still. IV site free of s/s of infection or infiltration. Vital signs obtained. Assessment and data collection initiated.     Two nurse skin assessment performed by Dionne RN and Selene RN. Oriented to room.     Policies and procedures for  explained. All questions answered with no further questions at this time. Fall prevention and safety brochure discussed with patient.  Bed alarm on. Call light in reach.

## 2024-07-18 NOTE — PROGRESS NOTES
Jose montenegro notified of rash/redness to patient right arm, upper chest, and back. Patient denies any trouble breathing or swallowing. Patient denies that rash itches or hurts in any way. Patient/spouse initially declines any intervention and would like to just watch it. No new orders at this time. Monitor.

## 2024-07-18 NOTE — DISCHARGE INSTR - DIET
Good nutrition is important when healing from an illness, injury, or surgery.  Follow any nutrition recommendations given to you during your hospital stay.   If you were given an oral nutrition supplement while in the hospital, continue to take this supplement at home.  You can take it with meals, in-between meals, and/or before bedtime. These supplements can be purchased at most local grocery stores, pharmacies, and chain [employer]-stores.   If you have any questions about your diet or nutrition, call the hospital and ask for the dietitian.    Regular/Carb Control Diet    Increase fluid and fiber intake to prevent constipation.

## 2024-07-18 NOTE — DISCHARGE INSTRUCTIONS
ACDF- Anterior cervical discectomy and fusion    Activity    No lifting, pushing, or pulling  Up as tolerated at least 3-4 times per day.   Up and moving helps to prevent blood clots.   Wear ruslan hose as directed per your physician   No driving for two weeks or as directed per physician     Collar    If collar is present, wear cervical collar at all times until seen at your follow up visit   May remove for dressing or bathing  Keep collar clean    Incision care    Apply clean dry dressing to incision once a day or as needed  You may shower when your incision is not draining/or as directed per your physician     Diet    Increase Fluid/Water intake, eat foods high in fiber, fruits and vegetables to help to prevent constipation  Examples of foods high in fiber    Vegetables      All vegetables, especially asparagus, bean sprouts, broccoli, [place]  sprouts, cabbage, carrots, cauliflower, celery, corn, greens, green beans,  green pepper, onions, peas, potatoes (with skin), snow peas, spinach,  squash, sweet potatoes, tomatoes, zucchini    For maximum fiber intake, eat the peels of fruits and vegetables just be sure  to wash them well first.     Fruits    All fruits, especially apples, berries, grapefruits, mangoes, nectarines,  oranges, peaches, pears, dried fruits (figs, dates, prunes, raisins)    Choose raw fruits and vegetables over juice, cooked, or canned raw fruit  has more fiber. Dried fruit is also a good source of fiber.       Some of the common symptoms of a surgical site infection are:    Symptoms in the area where the surgery took place:   Redness   Drainage   Pus   Pain   Swelling   Bad smell     Prevention of surgical site infection:    Make sure that your healthcare providers clean their hands before examining you - either with soap and water or an alcohol-based hand rubs.     Family and friends who visit you should not touch the surgical wound or dressings.     Family and friends should clean their hands

## 2024-07-18 NOTE — PROGRESS NOTES
Department of Orthopedic Surgery  Spine Service  Jose Jett PA-C Progress Note        Subjective:  7/18/2024  Doug is resting in bed.  He is doing well.  He does have some mild dysphagia as expected.  Postoperative pain is controlled.  He has been swallowing and eating well.  Planning on discharge home today with home health care for management of the drain.  Platelet count currently at 81    Vitals  VITALS:  /60   Pulse 73   Temp 98 °F (36.7 °C) (Oral)   Resp 18   Ht 1.88 m (6' 2\")   Wt 117 kg (258 lb)   SpO2 95%   BMI 33.13 kg/m²   24HR INTAKE/OUTPUT:    Intake/Output Summary (Last 24 hours) at 7/18/2024 0798  Last data filed at 7/18/2024 0318  Gross per 24 hour   Intake 400 ml   Output 40 ml   Net 360 ml     URINARY CATHETER OUTPUT (Contreras):     DRAIN/TUBE OUTPUT:  Closed/Suction Drain Neck-Output (ml): 20 ml      PHYSICAL EXAM:    Orientation:  alert and oriented to person, place and time    Incision:  dressing in place, clean, dry, intact    Upper Extremity Motor :   Deltoid:  5/5  Biceps:  5/5  Triceps:  5/5  Wrist Flexion:  5/5  Wrist Extension:  5/5  Finger Flexion:  5/5  Finger Extension:  5/5    Upper Motor Neuron Signs:  None  Upper Extremity Sensory:  Intact C3-T1 distribution    Flatus:  positive    ABNORMAL EXAM FINDINGS:  none    LABS:    HgB:    Lab Results   Component Value Date/Time    HGB 15.6 07/18/2024 06:44 AM         ASSESSMENT AND PLAN:    Post operative day 1    1:  Monitor labs and drain output  2:  Activity Level: Out of bed as tolerated  3:  Pain Control: Controlled  4:  Discharge Planning: Planning home today with home health care for drain manage  5:

## 2024-07-18 NOTE — PROGRESS NOTES
Discharge instructions given to patient and spouse. All questions answered. All belongings packed and ready for transport. Transport called to assist to private car via wheelchair.

## 2024-07-18 NOTE — OP NOTE
[place] [id number]                            OPERATIVE REPORT      PATIENT NAME: DONTAE MONTENEGRO JR             : 1960  MED REC NO: [id number]                       ROOM: [facility]                                               (General) POOL                                               RM    ACCOUNT NO: [id number]                       ADMIT DATE: 2024  PROVIDER: Cliff Dee MD      DATE OF PROCEDURE:  2024    SURGEON:  Cliff Dee MD    PREOPERATIVE DIAGNOSES:    1. Cervical disk herniation with cervical spondylosis of level C4-C5 producing an upper extremity radiculopathy, bilateral.  2. Status post previous anterior cervical diskectomy and fusion of level C5-C6 in year .    POSTOPERATIVE DIAGNOSES:    1. Cervical disk herniation with cervical spondylosis of level C4-C5 producing an upper extremity radiculopathy, bilateral.  2. Status post previous anterior cervical diskectomy and fusion of level C5-C6 in year .    PROCEDURES PERFORMED:    1. Anterior cervical diskectomy at level C4-C5 with complete uncus decompression and clearance of canal stenosis at level of C4-C5.  2. Anterior cervical interbody fusion at level of C4-C5.  3. Use of allograft bone graft tricortical in structure, Xtant Elevate, a 7 x 14 x 13 mm size for the level of the C4-C5 fusion.  4. Use of allograft bone graft DBM by Xtant, the 2 mL FiberX and a 2.5 mL of the Proteios growth factor for this interbody fusion of level C4-C5.  5. Plating of cervical spine with the 12 mm SeaSpine Admiral plate attached to the bone with total of 4 screws, each measuring 16 mm in length.    ASSISTANT:  Jose Jett PA-C, who has assisted with the patient positioning, prepping, draping, surgical retraction, decompressive diskectomy and fusion, plating, wound closure, patient transfer, and all aspects of the operation.    ANESTHESIA:  General.    BLOOD  foramina post decompression and leaving this left a very nicely open canal pathway for the exiting C5 nerve root.  This interbody surface has been well decorticated and was now well prepared for bone grafting.  A 7 x 14 x 13 mm size bone graft by the Elevate system, a tricortical bone graft, we filled this with use of DBM and the waxed the pin sites and plated levels of C4-C5 using a 12 mm plate by the [employer] System and attached this to bone using 4 bone screws, each measuring 16 mm in length.  Each locking mechanism was engaged and the plate fit very well.  We ensured this with x-ray and reviewed this in the room.  Irrigation was complete.  We ensured no soft tissue entrapment or bleeding, and went on to place a YANIRA drain and closed in layers.  All counts were correct.  We had applied dressings and sewed the drain tube in, and then applied an Aspen collar.  We then awakened the patient and took him back to recovery postextubation.  My first assistant was also Jose Jett PA-C, as described, and there were no complications.          SUSAN WILLETT MD      D:  07/17/2024 12:46:42     T:  07/17/2024 18:36:30     [facility]/S  Job #:  [id number]     Doc#:  [id number]

## 2024-07-18 NOTE — PLAN OF CARE
Problem: Discharge Planning  Goal: Discharge to home or other facility with appropriate resources  Outcome: Progressing  Flowsheets (Taken 7/17/2024 2330)  Discharge to home or other facility with appropriate resources:   Arrange for needed discharge resources and transportation as appropriate   Identify barriers to discharge with patient and caregiver   Identify discharge learning needs (meds, wound care, etc)     Problem: Chronic Conditions and Co-morbidities  Goal: Patient's chronic conditions and co-morbidity symptoms are monitored and maintained or improved  Outcome: Progressing  Flowsheets (Taken 7/17/2024 2330)  Care Plan - Patient's Chronic Conditions and Co-Morbidity Symptoms are Monitored and Maintained or Improved:   Monitor and assess patient's chronic conditions and comorbid symptoms for stability, deterioration, or improvement   Collaborate with multidisciplinary team to address chronic and comorbid conditions and prevent exacerbation or deterioration   Update acute care plan with appropriate goals if chronic or comorbid symptoms are exacerbated and prevent overall improvement and discharge     Problem: Pain  Goal: Verbalizes/displays adequate comfort level or baseline comfort level  Outcome: Progressing  Flowsheets (Taken 7/17/2024 2330)  Verbalizes/displays adequate comfort level or baseline comfort level:   Assess pain using appropriate pain scale   Encourage patient to monitor pain and request assistance   Administer analgesics based on type and severity of pain and evaluate response   Implement non-pharmacological measures as appropriate and evaluate response     Problem: Safety - Adult  Goal: Free from fall injury  Outcome: Progressing  Flowsheets (Taken 7/17/2024 2330)  Free From Fall Injury: Instruct family/caregiver on patient safety     Care plan reviewed with patient.  Patient verbalizes understanding of the plan of care and contributes to goal setting.

## 2024-07-21 NOTE — DISCHARGE SUMMARY
[address] [address]                            DISCHARGE SUMMARY      PATIENT NAME: DONTAE MONTENEGRO JR             : 1960  MED REC NO: [id number]                       ROOM: [facility]  ACCOUNT NO: [id number]                       ADMIT DATE: 2024  PROVIDER: TRICE Ray      DISCHARGE DIAGNOSIS:  Cervical spinal stenosis and disk herniation at C4-5 with resultant radiculopathy.    OPERATION PERFORMED:  Removal of plate at C5-6 with ACDF C4-5 with allograft bone and plating on 2024 with Dr. Dee.    HOSPITAL COURSE:  The patient is a 63-year-old gentleman, who is known to us having been through previous cervical spine surgery in year  including an ACDF of C5-C6.  He presented to our office with complaints of recurring symptoms of cervical pain, headaches, and radiculopathy with evidence of adjacent level stenosis at the C4-5 level above.  He elected to proceed with operative management after failing to improve despite conservative management.  We did note in his preoperative workup he had a significantly low platelet count, likely due to his advanced liver disease.  Platelets were given prior to the surgery as well as intraoperatively.  He underwent surgery on the date of 2024, and postoperatively, was admitted to floor 7 for observation.  He was seen on postop day 1, doing well.  He had some mild dysphagia as expected, and was swallowing and eating well, though in general, he was feeling well with improvement of some of his preoperative radiculopathy and ultimately ready to discharge home.  He did discharge home with a YANIRA drain in place and a dressing intact and an order for Home Health for continued management of this at home.    DISCHARGE INSTRUCTIONS:  Include continued use of the cervical collar for 2 weeks.  No driving for 2 weeks.  No lifting greater than 10 pounds.  We will see him back in the office in 2

## 2024-08-16 PROBLEM — Z98.890 POST-OPERATIVE STATE: Status: RESOLVED | Noted: 2024-07-17 | Resolved: 2024-08-16

## 2024-09-25 RX ORDER — PANTOPRAZOLE SODIUM 40 MG/1
TABLET, DELAYED RELEASE ORAL
Qty: 30 TABLET | Refills: 0 | OUTPATIENT
Start: 2024-09-25

## 2025-02-08 ENCOUNTER — APPOINTMENT (OUTPATIENT)
Dept: GENERAL RADIOLOGY | Age: 65
End: 2025-02-08
Payer: MEDICARE

## 2025-02-08 ENCOUNTER — HOSPITAL ENCOUNTER (EMERGENCY)
Age: 65
Discharge: HOME | End: 2025-02-08
Payer: MEDICARE

## 2025-02-08 VITALS
OXYGEN SATURATION: 100 % | SYSTOLIC BLOOD PRESSURE: 132 MMHG | RESPIRATION RATE: 20 BRPM | TEMPERATURE: 98 F | DIASTOLIC BLOOD PRESSURE: 66 MMHG | HEART RATE: 69 BPM

## 2025-02-08 DIAGNOSIS — S82.831A CLOSED FRACTURE OF DISTAL END OF RIGHT FIBULA, UNSPECIFIED FRACTURE MORPHOLOGY, INITIAL ENCOUNTER: Primary | ICD-10-CM

## 2025-02-08 PROCEDURE — 99203 OFFICE O/P NEW LOW 30 MIN: CPT | Performed by: NURSE PRACTITIONER

## 2025-02-08 PROCEDURE — 99213 OFFICE O/P EST LOW 20 MIN: CPT

## 2025-02-08 PROCEDURE — 73610 X-RAY EXAM OF ANKLE: CPT

## 2025-02-08 ASSESSMENT — ENCOUNTER SYMPTOMS
NAUSEA: 0
VOMITING: 0
SORE THROAT: 0
RHINORRHEA: 0
COUGH: 0
DIARRHEA: 0
SHORTNESS OF BREATH: 0
CHEST TIGHTNESS: 0

## 2025-02-08 ASSESSMENT — PAIN DESCRIPTION - LOCATION: LOCATION: ANKLE

## 2025-02-08 ASSESSMENT — PAIN SCALES - GENERAL: PAINLEVEL_OUTOF10: 5

## 2025-02-08 NOTE — ED TRIAGE NOTES
Pt to UC with c/o right ankle pain. Pt reports he twisted it 1 week ago while playing with his granddaughter. Pt using a cane from home.

## 2025-02-08 NOTE — ED PROVIDER NOTES
[facility]RGENT [facility]  Urgent Care Encounter       CHIEF COMPLAINT       Chief Complaint   Patient presents with    Ankle Injury     Right        Nurses Notes reviewed and I agree except as noted in the HPI.  HISTORY OF PRESENT ILLNESS   Mitchell Mccall Jr is a 64 y.o. male who presents to the [facility] urgent care for evaluation of a right ankle injury.  Reports this occurred roughly 1 week ago.  He does report pain with ambulation.  Does have full range of motion.  Is noted to have ecchymosis and edema on the lateral aspect of his right foot.    The history is provided by the patient. No  was used.       REVIEW OF SYSTEMS     Review of Systems   Constitutional:  Negative for activity change, appetite change, chills, fatigue and fever.   HENT:  Negative for ear discharge, ear pain, rhinorrhea and sore throat.    Respiratory:  Negative for cough, chest tightness and shortness of breath.    Cardiovascular:  Negative for chest pain.   Gastrointestinal:  Negative for diarrhea, nausea and vomiting.   Genitourinary:  Negative for dysuria.   Musculoskeletal:  Positive for arthralgias.   Skin:  Negative for rash.   Allergic/Immunologic: Negative for environmental allergies and food allergies.   Neurological:  Negative for dizziness and headaches.       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    CURRENT MEDICATIONS       Discharge

## 2025-04-14 ENCOUNTER — APPOINTMENT (OUTPATIENT)
Dept: CT IMAGING | Age: 65
End: 2025-04-14
Payer: MEDICARE

## 2025-04-14 VITALS
RESPIRATION RATE: 16 BRPM | BODY MASS INDEX: 34.67 KG/M2 | DIASTOLIC BLOOD PRESSURE: 76 MMHG | TEMPERATURE: 98.7 F | OXYGEN SATURATION: 96 % | HEART RATE: 67 BPM | SYSTOLIC BLOOD PRESSURE: 121 MMHG | WEIGHT: 270 LBS

## 2025-04-14 DIAGNOSIS — R51.9 ACUTE NONINTRACTABLE HEADACHE, UNSPECIFIED HEADACHE TYPE: Primary | ICD-10-CM

## 2025-04-14 DIAGNOSIS — R10.84 GENERALIZED ABDOMINAL PAIN: ICD-10-CM

## 2025-04-14 LAB
ALBUMIN SERPL BCG-MCNC: 4.1 G/DL (ref 3.4–4.9)
ALP SERPL-CCNC: 117 U/L (ref 40–129)
ALT SERPL W/O P-5'-P-CCNC: 45 U/L (ref 10–50)
ANION GAP SERPL CALC-SCNC: 14 MEQ/L (ref 8–16)
AST SERPL-CCNC: 44 U/L (ref 10–50)
BASOPHILS ABSOLUTE: 0 THOU/MM3 (ref 0–0.1)
BASOPHILS NFR BLD AUTO: 0.6 %
BILIRUB CONJ SERPL-MCNC: 0.6 MG/DL (ref 0–0.2)
BILIRUB SERPL-MCNC: 1.7 MG/DL (ref 0.3–1.2)
BILIRUB UR QL STRIP.AUTO: NEGATIVE
BUN SERPL-MCNC: 20 MG/DL (ref 8–23)
CALCIUM SERPL-MCNC: 9.3 MG/DL (ref 8.8–10.2)
CHARACTER UR: CLEAR
CHLORIDE SERPL-SCNC: 99 MEQ/L (ref 98–111)
CO2 SERPL-SCNC: 19 MEQ/L (ref 22–29)
COLOR, UA: YELLOW
CREAT SERPL-MCNC: 0.8 MG/DL (ref 0.7–1.2)
CRP SERPL-MCNC: < 0.3 MG/DL (ref 0–0.5)
DEPRECATED RDW RBC AUTO: 45.3 FL (ref 35–45)
EKG ATRIAL RATE: 68 BPM
EKG P AXIS: 55 DEGREES
EKG P-R INTERVAL: 184 MS
EKG Q-T INTERVAL: 398 MS
EKG QRS DURATION: 102 MS
EKG QTC CALCULATION (BAZETT): 423 MS
EKG R AXIS: 81 DEGREES
EKG T AXIS: 58 DEGREES
EKG VENTRICULAR RATE: 68 BPM
EOSINOPHIL NFR BLD AUTO: 0.6 %
EOSINOPHILS ABSOLUTE: 0 THOU/MM3 (ref 0–0.4)
ERYTHROCYTE [DISTWIDTH] IN BLOOD BY AUTOMATED COUNT: 14.1 % (ref 11.5–14.5)
GFR SERPL CREATININE-BSD FRML MDRD: > 90 ML/MIN/1.73M2
GLUCOSE SERPL-MCNC: 192 MG/DL (ref 74–109)
GLUCOSE UR QL STRIP.AUTO: NEGATIVE MG/DL
HCT VFR BLD AUTO: 52.6 % (ref 42–52)
HGB BLD-MCNC: 17.3 GM/DL (ref 14–18)
HGB UR QL STRIP.AUTO: NEGATIVE
IMM GRANULOCYTES # BLD AUTO: 0.02 THOU/MM3 (ref 0–0.07)
IMM GRANULOCYTES NFR BLD AUTO: 0.3 %
KETONES UR QL STRIP.AUTO: ABNORMAL
LACTIC ACID, SEPSIS: 1.6 MMOL/L (ref 0.5–1.9)
LIPASE SERPL-CCNC: 61 U/L (ref 13–60)
LYMPHOCYTES ABSOLUTE: 1 THOU/MM3 (ref 1–4.8)
LYMPHOCYTES NFR BLD AUTO: 15.6 %
MAGNESIUM SERPL-MCNC: 2 MG/DL (ref 1.6–2.6)
MCH RBC QN AUTO: 28.9 PG (ref 26–33)
MCHC RBC AUTO-ENTMCNC: 32.9 GM/DL (ref 32.2–35.5)
MCV RBC AUTO: 88 FL (ref 80–94)
MONOCYTES ABSOLUTE: 0.5 THOU/MM3 (ref 0.4–1.3)
MONOCYTES NFR BLD AUTO: 8.5 %
NEUTROPHILS ABSOLUTE: 4.8 THOU/MM3 (ref 1.8–7.7)
NEUTROPHILS NFR BLD AUTO: 74.4 %
NITRITE UR QL STRIP: NEGATIVE
NRBC BLD AUTO-RTO: 0 /100 WBC
OSMOLALITY SERPL CALC.SUM OF ELEC: 272.3 MOSMOL/KG (ref 275–300)
PH UR STRIP.AUTO: 6 [PH] (ref 5–9)
PLATELET # BLD AUTO: 91 THOU/MM3 (ref 130–400)
PMV BLD AUTO: 10.8 FL (ref 9.4–12.4)
POTASSIUM SERPL-SCNC: 4.2 MEQ/L (ref 3.5–5.2)
PROT SERPL-MCNC: 6.6 G/DL (ref 6.4–8.3)
PROT UR STRIP.AUTO-MCNC: NEGATIVE MG/DL
RBC # BLD AUTO: 5.98 MILL/MM3 (ref 4.7–6.1)
SODIUM SERPL-SCNC: 132 MEQ/L (ref 135–145)
SP GR UR REFRACT.AUTO: > 1.03 (ref 1–1.03)
TROPONIN, HIGH SENSITIVITY: 11 NG/L (ref 0–12)
UROBILINOGEN, URINE: 1 EU/DL (ref 0–1)
WBC # BLD AUTO: 6.4 THOU/MM3 (ref 4.8–10.8)
WBC #/AREA URNS HPF: NEGATIVE /[HPF]

## 2025-04-14 PROCEDURE — 6360000002 HC RX W HCPCS: Performed by: EMERGENCY MEDICINE

## 2025-04-14 PROCEDURE — 82248 BILIRUBIN DIRECT: CPT

## 2025-04-14 PROCEDURE — 84484 ASSAY OF TROPONIN QUANT: CPT

## 2025-04-14 PROCEDURE — 86140 C-REACTIVE PROTEIN: CPT

## 2025-04-14 PROCEDURE — 96375 TX/PRO/DX INJ NEW DRUG ADDON: CPT

## 2025-04-14 PROCEDURE — 70450 CT HEAD/BRAIN W/O DYE: CPT

## 2025-04-14 PROCEDURE — 81003 URINALYSIS AUTO W/O SCOPE: CPT

## 2025-04-14 PROCEDURE — 96372 THER/PROPH/DIAG INJ SC/IM: CPT

## 2025-04-14 PROCEDURE — 99285 EMERGENCY DEPT VISIT HI MDM: CPT

## 2025-04-14 PROCEDURE — 83690 ASSAY OF LIPASE: CPT

## 2025-04-14 PROCEDURE — 36415 COLL VENOUS BLD VENIPUNCTURE: CPT

## 2025-04-14 PROCEDURE — 74177 CT ABD & PELVIS W/CONTRAST: CPT

## 2025-04-14 PROCEDURE — 93005 ELECTROCARDIOGRAM TRACING: CPT | Performed by: EMERGENCY MEDICINE

## 2025-04-14 PROCEDURE — 83605 ASSAY OF LACTIC ACID: CPT

## 2025-04-14 PROCEDURE — 72125 CT NECK SPINE W/O DYE: CPT

## 2025-04-14 PROCEDURE — 83735 ASSAY OF MAGNESIUM: CPT

## 2025-04-14 PROCEDURE — 85025 COMPLETE CBC W/AUTO DIFF WBC: CPT

## 2025-04-14 PROCEDURE — 80053 COMPREHEN METABOLIC PANEL: CPT

## 2025-04-14 PROCEDURE — 96374 THER/PROPH/DIAG INJ IV PUSH: CPT

## 2025-04-14 PROCEDURE — 6360000004 HC RX CONTRAST MEDICATION: Performed by: EMERGENCY MEDICINE

## 2025-04-14 RX ORDER — ORPHENADRINE CITRATE 30 MG/ML
60 INJECTION INTRAMUSCULAR; INTRAVENOUS ONCE
Status: COMPLETED | OUTPATIENT
Start: 2025-04-14 | End: 2025-04-14

## 2025-04-14 RX ORDER — DROPERIDOL 2.5 MG/ML
1.25 INJECTION, SOLUTION INTRAMUSCULAR; INTRAVENOUS ONCE
Status: COMPLETED | OUTPATIENT
Start: 2025-04-14 | End: 2025-04-14

## 2025-04-14 RX ORDER — METOCLOPRAMIDE HYDROCHLORIDE 5 MG/ML
10 INJECTION INTRAMUSCULAR; INTRAVENOUS ONCE
Status: COMPLETED | OUTPATIENT
Start: 2025-04-14 | End: 2025-04-14

## 2025-04-14 RX ORDER — CYCLOBENZAPRINE HCL 10 MG
10 TABLET ORAL 2 TIMES DAILY PRN
Qty: 10 TABLET | Refills: 0 | Status: SHIPPED | OUTPATIENT
Start: 2025-04-14 | End: 2025-04-19

## 2025-04-14 RX ORDER — IOPAMIDOL 755 MG/ML
80 INJECTION, SOLUTION INTRAVASCULAR
Status: COMPLETED | OUTPATIENT
Start: 2025-04-14 | End: 2025-04-14

## 2025-04-14 RX ORDER — ORPHENADRINE CITRATE 30 MG/ML
60 INJECTION INTRAMUSCULAR; INTRAVENOUS ONCE
Status: DISCONTINUED | OUTPATIENT
Start: 2025-04-14 | End: 2025-04-14

## 2025-04-14 RX ORDER — DIPHENHYDRAMINE HYDROCHLORIDE 50 MG/ML
50 INJECTION, SOLUTION INTRAMUSCULAR; INTRAVENOUS ONCE
Status: COMPLETED | OUTPATIENT
Start: 2025-04-14 | End: 2025-04-14

## 2025-04-14 RX ORDER — KETOROLAC TROMETHAMINE 30 MG/ML
30 INJECTION, SOLUTION INTRAMUSCULAR; INTRAVENOUS ONCE
Status: COMPLETED | OUTPATIENT
Start: 2025-04-14 | End: 2025-04-14

## 2025-04-14 RX ORDER — DEXAMETHASONE SODIUM PHOSPHATE 4 MG/ML
10 INJECTION, SOLUTION INTRA-ARTICULAR; INTRALESIONAL; INTRAMUSCULAR; INTRAVENOUS; SOFT TISSUE ONCE
Status: COMPLETED | OUTPATIENT
Start: 2025-04-14 | End: 2025-04-14

## 2025-04-14 RX ADMIN — KETOROLAC TROMETHAMINE 30 MG: 30 INJECTION, SOLUTION INTRAMUSCULAR at 17:32

## 2025-04-14 RX ADMIN — DROPERIDOL 1.25 MG: 2.5 INJECTION, SOLUTION INTRAMUSCULAR; INTRAVENOUS at 16:49

## 2025-04-14 RX ADMIN — METOCLOPRAMIDE 10 MG: 5 INJECTION, SOLUTION INTRAMUSCULAR; INTRAVENOUS at 17:31

## 2025-04-14 RX ADMIN — IOPAMIDOL 80 ML: 755 INJECTION, SOLUTION INTRAVENOUS at 18:34

## 2025-04-14 RX ADMIN — DEXAMETHASONE SODIUM PHOSPHATE 10 MG: 4 INJECTION, SOLUTION INTRAMUSCULAR; INTRAVENOUS at 17:31

## 2025-04-14 RX ADMIN — DIPHENHYDRAMINE HYDROCHLORIDE 50 MG: 50 INJECTION INTRAMUSCULAR; INTRAVENOUS at 17:32

## 2025-04-14 RX ADMIN — ORPHENADRINE CITRATE 60 MG: 60 INJECTION INTRAMUSCULAR; INTRAVENOUS at 19:43

## 2025-04-14 NOTE — DISCHARGE INSTRUCTIONS
Smoking cigarettes or being around anyone that smokes cigarettes can cause constriction of the blood vessels in the brain which in turn can cause you to have further headaches.  Please follow-up with your GI doctor  for the abnormal CT of your liver and follow-up with your PCP for your headaches.  Be cautious with your Tylenol use follow any recommendations your specialist has given you about Tylenol use.    For pain use acetaminophen (Tylenol) or ibuprofen (Motrin / Advil), unless prescribed medications that have acetaminophen or ibuprofen (or similar medications) in it.  You can take over the counter acetaminophen tablets (1 - 2 tablets of the 500-mg strength every 6 hours) or ibuprofen tablets (2 tablets every 4 hours).   Avoid taking narcotics for headaches (can cause a worse headache in several hours after taking the medication).  Make sure that you drink plenty of water or Gatorade (or similar solution) to keep yourself hydrated.    PLEASE RETURN TO THE EMERGENCY DEPARTMENT IMMEDIATELY for worsening symptoms, change in vision / hearing / taste, ringing in your ears, loss of sensation or difficulty moving your arms or legs, or if you develop any concerning symptoms such as: high fever not relieved by acetaminophen (Tylenol) and/or ibuprofen (Motrin / Advil), chills, shortness of breath, chest pain, feeling of your heart fluttering or racing, persistent nausea and/or vomiting, vomiting up blood, blood in your stool, numbness, loss of consciousness, weakness or tingling in the arms or legs or change in color of the extremities, changes in mental status, persistent headache, blurry vision, loss of bladder / bowel control, unable to follow up with your physician, or other any other care or concern

## 2025-04-14 NOTE — ED PROVIDER NOTES
[facility] EMERGENCY DEPARTMENT    EMERGENCY MEDICINE      Pt Name: Mitchell Mccall Jr  MRN: [id number]  Birthdate 1960  Date of evaluation: 4/14/2025  Treating Physician: Dr. Ríos  Resident Physician: Pallavi Olivo MD    CHIEF COMPLAINT       Chief Complaint   Patient presents with    Migraine    Abdominal Pain     History obtained from chart review and the patient.    HISTORY OF PRESENT ILLNESS    HPI    Mitchell Mccall Jr is a 64 y.o. male with PMH COPD, DM, Cirrhosis, presents to the emergency department for evaluation of Headache, neck pain, abdominal pain, lower back pain.  Patient reported that he has been having a headache for the past 3 weeks that has been persistent.  Over the past 3 days the headache has worsened and he has developed upper abdominal pain and neck pain on his posterior cervical neck.  Patient stated that today he had an episode of emesis after eating a banana and does have nausea.  Patient reports chronic shortness of breath due to COPD.  Patient denying any chest pain, fever, numbness, weakness, trauma, swelling.    The patient has no other acute complaints at this time.    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 due to alcohol (HCC)     COPD (chronic obstructive pulmonary disease) (HCC)     Diabetes mellitus (HCC)     Hx of blood clots 2022    in liver       Past Surgical History:   Procedure Laterality Date    BACK SURGERY  08/05/2009    [facility]    CERVICAL FUSION N/A 7/17/2024    Removal of Plate C5-C6, ACDF C4-C6 with Seaspine performed by Cliff Dee MD at [facility]    CHOLECYSTECTOMY      NECK SURGERY  06/17/2009    at [facility]    UPPER GASTROINTESTINAL ENDOSCOPY N/A 01/30/2024    EGD performed by Pepe Lemon MD at [facility]    UPPER GASTROINTESTINAL ENDOSCOPY Left 01/30/2024    EGD BAND LIGATION performed by Pepe Lemon MD at [facility]    UPPER GASTROINTESTINAL ENDOSCOPY Left

## 2025-04-14 NOTE — ED NOTES
Pt to er. Pt c/o migraine and neck pain for a week. Pt states had fusion surgery in neck last July. States has a lump on back of neck that is tender and warm to touch. Pt states also has abd pain. States vomited x 1 this morning. Denies constipation or diarrhea. Denies any urinary symptoms. States has non alcoholic cirrhosis. Resp regular. Family at side.

## 2025-05-06 ENCOUNTER — TELEPHONE (OUTPATIENT)
Dept: ADMINISTRATIVE | Age: 65
End: 2025-05-06

## (undated) DEVICE — EVACUATOR SURG 100CC SIL BLB SUCT RESVR FOR CLS WND DRNGE

## (undated) DEVICE — 3M™ TEGADERM™ CHG CHLORHEXIDINE GLUCONATE I.V. PORT DRESSING STERILE U.S. ONLY 25/CARTON 4 CARTONS/CASE 1665: Brand: TEGADERM™

## (undated) DEVICE — COLLAR CERV UNIV AD L13-19IN TRACH OPN TWO PC RIG POLYETH

## (undated) DEVICE — ADMIRAL ACP DRILL, 12MM: Brand: ADMIRAL

## (undated) DEVICE — PACK-MAJOR

## (undated) DEVICE — GLOVE SURG SZ 65 THK91MIL LTX FREE SYN POLYISOPRENE

## (undated) DEVICE — GLOVE SURG SZ 9 THK91MIL LTX FREE SYN POLYISOPRENE ANTI

## (undated) DEVICE — GOWN,SIRUS,NON REINFRCD,LARGE,SET IN SL: Brand: MEDLINE

## (undated) DEVICE — Device

## (undated) DEVICE — MASTISOL ADHESIVE LIQ 2/3ML

## (undated) DEVICE — AGENT HEMOSTATIC SURGIFLOW MATRIX KIT W/THROMBIN

## (undated) DEVICE — SUTURE MONOCRYL SZ 4-0 L27IN ABSRB UD L19MM PS-2 1/2 CIR PRIM Y426H

## (undated) DEVICE — Device: Brand: FABCO

## (undated) DEVICE — SUTURE NONABSORBABLE 3-0 12X18 IN PRE-CUT VICRYL VIO SUTUPAK VCP104G

## (undated) DEVICE — SUTURE VICRYL + SZ 3-0 L27IN ABSRB UD FS2 3/8 CIR REV CUT

## (undated) DEVICE — CODMAN® SURGICAL PATTIES 1/2" X 1/2" (1.27CM X 1.27CM): Brand: CODMAN®

## (undated) DEVICE — APPLICATOR MEDICATED 10.5 CC SOLUTION HI LT ORNG CHLORAPREP

## (undated) DEVICE — DRAIN SURG FLAT W7MMXL20CM FULL PERF

## (undated) DEVICE — STRIP,CLOSURE,WOUND,MEDI-STRIP,1/2X4: Brand: MEDLINE

## (undated) DEVICE — BUR CUT RND FLUT AGRSV 4.0MM

## (undated) DEVICE — SUTURE ETHILON SZ 3-0 L18IN NONABSORBABLE BLK L24MM FS-1 3/8 663G

## (undated) DEVICE — PAD OP RM W20XL72XH2IN PRECIS CUT FLAT EC BIOCLINIC

## (undated) DEVICE — DRAPE MICSCP W132XL406CM LENS DIA68MM W VARI LENS2 FOR LEICA